# Patient Record
Sex: FEMALE | Race: WHITE | NOT HISPANIC OR LATINO | Employment: STUDENT | URBAN - METROPOLITAN AREA
[De-identification: names, ages, dates, MRNs, and addresses within clinical notes are randomized per-mention and may not be internally consistent; named-entity substitution may affect disease eponyms.]

---
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
## 2020-08-14 LAB — EXTERNAL CHLAMYDIA RESULT: NOT DETECTED

## 2021-07-01 ENCOUNTER — VBI (OUTPATIENT)
Dept: ADMINISTRATIVE | Facility: OTHER | Age: 21
End: 2021-07-01

## 2021-07-01 RX ORDER — NORETHINDRONE ACETATE AND ETHINYL ESTRADIOL 1.5-30(21)
1 KIT ORAL DAILY
COMMUNITY
End: 2021-10-18 | Stop reason: SDUPTHER

## 2021-07-01 NOTE — TELEPHONE ENCOUNTER
Upon review of the In Basket request we were able to locate, review, and update the patient chart as requested for Chlamydia  Any additional questions or concerns should be emailed to the Practice Liaisons via [email or web address] email, please do not reply via In Basket      Thank you  Quirino Goldman

## 2021-07-02 ENCOUNTER — OFFICE VISIT (OUTPATIENT)
Dept: OBGYN CLINIC | Facility: CLINIC | Age: 21
End: 2021-07-02
Payer: COMMERCIAL

## 2021-07-02 VITALS
BODY MASS INDEX: 22.82 KG/M2 | HEIGHT: 66 IN | DIASTOLIC BLOOD PRESSURE: 62 MMHG | WEIGHT: 142 LBS | SYSTOLIC BLOOD PRESSURE: 90 MMHG

## 2021-07-02 DIAGNOSIS — Z30.09 BIRTH CONTROL COUNSELING: Primary | ICD-10-CM

## 2021-07-02 PROCEDURE — 99213 OFFICE O/P EST LOW 20 MIN: CPT | Performed by: OBSTETRICS & GYNECOLOGY

## 2021-07-02 NOTE — PROGRESS NOTES
[address] [facility]   [address] [facility] [facility] [address], [place] [address]    Assessment/Plan:  1  Birth control counseling  Comments:  Pt feels some side effects with current OCP  A/P:   Reviewed in detail with pt how OCPs work and why sometimes side effects can occur during placebo week or start of new active pills  I believe her moods may be very sensitive to restarting hormones with new pack, but within a day or two she seems fine  Discussed other BCMs - Nexplanon, Depo Provera, IUDs  Also discussed considering extended use of current OCP and skipping some periods to decrease frequency of side effects  Reviewed SE with extended use can be BTB and how to deal with it  She is interested in trying extended use, she will do that now  Considering Nexplanon, IUD - will do some reasearch  Gave information as well as recom pt check out [employer]eds[employer]  org web site  She declines need of refill for pills today  Next appt: WA due in August    Subjective:   Cal Vo is a 24 y o  No obstetric history on file  female  CC: discuss birth control options    HPI:   Amirah Hi has been on this birth control pill for a couple years  She had been feeling well but noted increasing anxiety over the last few months  She feels severe anxiety and mood changes on first 1-2 days of active pills, after finishing placebo pills in pack  It gets better but then occurs again the next month  She wishes to discuss other options  Gyn History  Patient's last menstrual period was 06/21/2021 (exact date)  Last pap smear: not done     She  reports being sexually active and has had partner(s) who are Male  She reports using the following method of birth control/protection: OCP         OB History  G0    Past Medical History:  04/2021: Appendicitis     Past Surgical History:  04/13/2021: APPENDECTOMY  2016: WISDOM TOOTH EXTRACTION     Social History     Tobacco Use    Smoking status: Never Smoker    Smokeless tobacco: Never Used   Vaping Use    Vaping Use: Never used   Substance Use Topics    Alcohol use: Yes     Comment: social    Drug use: Not Currently     Types: Marijuana          Current Outpatient Medications:     norethindrone-ethinyl estradiol-iron (MICROGESTIN FE1 5/30) 1 5-30 MG-MCG tablet, Take 1 tablet by mouth daily, Disp: , Rfl:     She is allergic to morphine       ROS: Review of Systems   Constitutional: Negative  Gastrointestinal: Negative  Genitourinary: Negative  Psychiatric/Behavioral: The patient is nervous/anxious  Objective:  BP 90/62   Ht 5' 6" (1 676 m)   Wt 64 4 kg (142 lb)   LMP 06/21/2021 (Exact Date)   Breastfeeding No   BMI 22 92 kg/m²      Physical Exam  Constitutional:       Appearance: Normal appearance  Neurological:      Mental Status: She is alert and oriented to person, place, and time     Psychiatric:         Behavior: Behavior normal

## 2021-10-12 ENCOUNTER — TELEPHONE (OUTPATIENT)
Dept: OBGYN CLINIC | Facility: CLINIC | Age: 21
End: 2021-10-12

## 2021-10-12 DIAGNOSIS — Z30.41 SURVEILLANCE FOR BIRTH CONTROL, ORAL CONTRACEPTIVES: Primary | ICD-10-CM

## 2021-10-16 ENCOUNTER — TELEPHONE (OUTPATIENT)
Dept: OTHER | Facility: OTHER | Age: 21
End: 2021-10-16

## 2021-10-18 ENCOUNTER — TELEPHONE (OUTPATIENT)
Dept: OTHER | Facility: OTHER | Age: 21
End: 2021-10-18

## 2021-10-18 ENCOUNTER — TELEPHONE (OUTPATIENT)
Dept: OBGYN CLINIC | Facility: CLINIC | Age: 21
End: 2021-10-18

## 2021-10-18 DIAGNOSIS — Z30.41 SURVEILLANCE FOR BIRTH CONTROL, ORAL CONTRACEPTIVES: Primary | ICD-10-CM

## 2021-10-18 RX ORDER — NORETHINDRONE ACETATE AND ETHINYL ESTRADIOL 1.5-30(21)
1 KIT ORAL DAILY
Qty: 28 TABLET | Refills: 0 | Status: SHIPPED | OUTPATIENT
Start: 2021-10-18 | End: 2021-11-07

## 2021-11-24 ENCOUNTER — ANNUAL EXAM (OUTPATIENT)
Dept: OBGYN CLINIC | Facility: CLINIC | Age: 21
End: 2021-11-24
Payer: COMMERCIAL

## 2021-11-24 VITALS
HEIGHT: 66 IN | DIASTOLIC BLOOD PRESSURE: 68 MMHG | WEIGHT: 136.6 LBS | BODY MASS INDEX: 21.95 KG/M2 | SYSTOLIC BLOOD PRESSURE: 108 MMHG

## 2021-11-24 DIAGNOSIS — Z01.419 ENCOUNTER FOR ANNUAL ROUTINE GYNECOLOGICAL EXAMINATION: Primary | ICD-10-CM

## 2021-11-24 DIAGNOSIS — Z11.3 ROUTINE SCREENING FOR STI (SEXUALLY TRANSMITTED INFECTION): ICD-10-CM

## 2021-11-24 DIAGNOSIS — Z12.4 CERVICAL CANCER SCREENING: ICD-10-CM

## 2021-11-24 DIAGNOSIS — Z30.41 SURVEILLANCE FOR BIRTH CONTROL, ORAL CONTRACEPTIVES: ICD-10-CM

## 2021-11-24 PROCEDURE — S0612 ANNUAL GYNECOLOGICAL EXAMINA: HCPCS | Performed by: OBSTETRICS & GYNECOLOGY

## 2021-11-24 RX ORDER — ESCITALOPRAM OXALATE 10 MG/1
10 TABLET ORAL DAILY
COMMUNITY
Start: 2021-11-22

## 2021-11-24 RX ORDER — IBUPROFEN 200 MG
200 TABLET ORAL EVERY 6 HOURS PRN
COMMUNITY

## 2021-11-24 RX ORDER — NORETHINDRONE ACETATE AND ETHINYL ESTRADIOL 1.5-30(21)
1 KIT ORAL DAILY
Qty: 84 TABLET | Refills: 4 | Status: SHIPPED | OUTPATIENT
Start: 2021-11-24

## 2021-11-30 LAB
C TRACH RRNA CVX QL NAA+PROBE: NEGATIVE
CYTOLOGIST CVX/VAG CYTO: NORMAL
DX ICD CODE: NORMAL
N GONORRHOEA RRNA CVX QL NAA+PROBE: NEGATIVE
OTHER STN SPEC: NORMAL
OTHER STN SPEC: NORMAL
PATH REPORT.FINAL DX SPEC: NORMAL
SL AMB NOTE:: NORMAL
SL AMB SPECIMEN ADEQUACY: NORMAL
SL AMB TEST METHODOLOGY: NORMAL

## 2021-12-23 ENCOUNTER — OFFICE VISIT (OUTPATIENT)
Dept: GASTROENTEROLOGY | Facility: CLINIC | Age: 21
End: 2021-12-23
Payer: COMMERCIAL

## 2021-12-23 VITALS
BODY MASS INDEX: 23.46 KG/M2 | HEIGHT: 66 IN | SYSTOLIC BLOOD PRESSURE: 108 MMHG | HEART RATE: 74 BPM | WEIGHT: 146 LBS | DIASTOLIC BLOOD PRESSURE: 68 MMHG

## 2021-12-23 DIAGNOSIS — K58.0 IRRITABLE BOWEL SYNDROME WITH DIARRHEA: ICD-10-CM

## 2021-12-23 DIAGNOSIS — R10.9 ABDOMINAL CRAMPING: Primary | ICD-10-CM

## 2021-12-23 PROCEDURE — 99203 OFFICE O/P NEW LOW 30 MIN: CPT | Performed by: REGISTERED NURSE

## 2022-03-05 LAB — HBA1C MFR BLD HPLC: 5.2 %

## 2022-12-03 DIAGNOSIS — Z30.41 SURVEILLANCE FOR BIRTH CONTROL, ORAL CONTRACEPTIVES: ICD-10-CM

## 2022-12-06 ENCOUNTER — TELEPHONE (OUTPATIENT)
Dept: OBGYN CLINIC | Facility: CLINIC | Age: 22
End: 2022-12-06

## 2022-12-06 RX ORDER — NORETHINDRONE ACETATE AND ETHINYL ESTRADIOL AND FERROUS FUMARATE 1.5-30(21)
KIT ORAL
Qty: 84 TABLET | Refills: 0 | Status: SHIPPED | OUTPATIENT
Start: 2022-12-06

## 2022-12-06 NOTE — TELEPHONE ENCOUNTER
Pt called and has a WA scheduled for 2/3/22  Pt is requesting a 90 day supply  Pt is Sunday start, recommended to take 2 pills today and 2 pills tomorrow, use back up protection and may have irreg spotting with current cycles  Please review and advise

## 2022-12-06 NOTE — TELEPHONE ENCOUNTER
Pt left a message requesting to have a refill for her birth control to be sent to [facility], pt was due to start her pills 3 days ago  Last WA 11/24/21-Left a message for patient informing she is past due for Stefano Pierre, please call back to schedule and will forward to provider to review for courtesy refill until seen

## 2022-12-06 NOTE — TELEPHONE ENCOUNTER
Agree   I sent script  33 yo F, hx adhd, depression, presents with LUQ abdominal pain, nonradiating, associated with mild nausea, and decreased appetite. took 2 tablets of pepcid pta, with minimal relief. pain constant for one day. denies associated fever, chills, vomiting or diarrhea. denies hx of abdominal surgeries or similar. denies rash. denies cp, sob or cough. notes no changes in diet. denies etoh.

## 2023-02-14 ENCOUNTER — ANNUAL EXAM (OUTPATIENT)
Dept: OBGYN CLINIC | Facility: CLINIC | Age: 23
End: 2023-02-14

## 2023-02-14 VITALS
DIASTOLIC BLOOD PRESSURE: 68 MMHG | BODY MASS INDEX: 27.83 KG/M2 | HEIGHT: 66 IN | SYSTOLIC BLOOD PRESSURE: 110 MMHG | WEIGHT: 173.2 LBS

## 2023-02-14 DIAGNOSIS — Z30.41 SURVEILLANCE FOR BIRTH CONTROL, ORAL CONTRACEPTIVES: ICD-10-CM

## 2023-02-14 DIAGNOSIS — Z30.09 COUNSELING FOR BIRTH CONTROL REGARDING INTRAUTERINE DEVICE (IUD): ICD-10-CM

## 2023-02-14 DIAGNOSIS — Z01.419 ENCOUNTER FOR ANNUAL ROUTINE GYNECOLOGICAL EXAMINATION: Primary | ICD-10-CM

## 2023-02-14 RX ORDER — ESCITALOPRAM OXALATE 5 MG/1
TABLET ORAL
COMMUNITY
Start: 2022-12-28

## 2023-02-14 RX ORDER — NORETHINDRONE ACETATE AND ETHINYL ESTRADIOL 1.5-30(21)
1 KIT ORAL DAILY
Qty: 84 TABLET | Refills: 4 | Status: SHIPPED | OUTPATIENT
Start: 2023-02-14

## 2023-02-14 NOTE — PROGRESS NOTES
Annual Wellness Visit  [address], [address], [facility], [address]    ASSESSMENT/PLAN: Bronwyn Henderson is a 25 y o  Jenny Wallace who presents for annual gynecologic exam     Encounter for routine gynecologic examination  - Routine well woman exam completed today  - Cervical Cancer Screening: Current ASCCP Guidelines reviewed  Last Pap: 11/24/2021 normal  Next Pap Due: 1 year, routine   - HPV Vaccination status: Immunization series complete  - STI screening offered including HIV testing: offered, pt declined  - Contraceptive counseling discussed  Current contraception: oral contraceptives (estrogen/progesterone),   - The following were reviewed in today's visit: STD testing, use and side effects of OCPs, family planning choices, exercise and healthy diet    Additional problems addressed during this visit:  1  Encounter for annual routine gynecological examination    2  Surveillance for birth control, oral contraceptives  A/P:   No contraindication to estrogen/progesterone birth control use identified  Reviewed that only condoms protect against STIs  Refilled for 1 year  Patient may switch to IUD  -     norethindrone-ethinyl estradiol-iron (Cathy FE 1 5/30) 1 5-30 MG-MCG tablet; Take 1 tablet by mouth daily    3  Counseling for birth control regarding intrauterine device (IUD)  A/P:   Pt interested in Progesterone IUD  Reviewed differences between Progesterone and non-hormonal IUDs  Discussed R/B/A, and placement procedure  Patient should call her insurance to check coverage  Pt to continue to use oral contraceptives (estrogen/progesterone)  Call office with first day of menses to schedule  Msg to  to check insurance coverage  Next visit: 1 year Wellness      CC:  Annual Gynecologic Examination    HPI: Bronwyn Henderson is a 25 y o  Jenny Wallace who presents for annual gynecologic examination    She denies any breast, urinary or pelvic issues at today's visit  Taking OCPs and getting monthly periods, very light  No new partner  Interested in IUD  Gyn History  Patient's last menstrual period was 2023 (exact date)  Last Pap: 2021 was normal    She  reports being sexually active and has had partner(s) who are male  She reports using the following method of birth control/protection: OCP  OB History      Past Medical History:  No date: Anxiety     Past Surgical History:  2021: APPENDECTOMY  2016: WISDOM TOOTH EXTRACTION     Family History   Problem Relation Age of Onset   • Anxiety disorder Mother    • Hyperlipidemia Father    • Polycystic ovary syndrome Sister    • No Known Problems Brother    • Heart disease Maternal Grandmother    • Obesity Maternal Grandmother    • Lung cancer Maternal Grandfather    • Alzheimer's disease Maternal Grandfather    • Leukemia Paternal Grandfather    • Breast cancer Paternal Aunt    • Uterine cancer Neg Hx    • Ovarian cancer Neg Hx    • Colon cancer Neg Hx    • Colon polyps Neg Hx         Social History     Tobacco Use   • Smoking status: Never   • Smokeless tobacco: Never   Vaping Use   • Vaping Use: Former   Substance Use Topics   • Alcohol use: Yes     Comment: social   • Drug use: Not Currently     Types: Marijuana          Current Outpatient Medications:   •  escitalopram (LEXAPRO) 10 mg tablet, Take 10 mg by mouth daily  , Disp: , Rfl:   •  escitalopram (LEXAPRO) 5 mg tablet, TAKE 1 TABLET BY MOUTH DAILY   TAKE WITH 10 MG = 15 MG DAILY, Disp: , Rfl:   •  hyoscyamine (LEVSIN/SL) 0 125 mg SL tablet, Take 1 tablet (0 125 mg total) by mouth every 4 (four) hours as needed for cramping, Disp: 60 tablet, Rfl: 2  •  ibuprofen (MOTRIN) 200 mg tablet, Take 200 mg by mouth every 6 (six) hours as needed for mild pain, Disp: , Rfl:   •  norethindrone-ethinyl estradiol-iron (Cathy FE 1 ) 1 5-30 MG-MCG tablet, Take 1 tablet by mouth daily, Disp: 84 tablet, Rfl: 4    She is allergic to morphine       ROS negative except as noted in HPI    Objective:  /68 (BP Location: Left arm, Patient Position: Sitting, Cuff Size: Large)   Ht 5' 6 25" (1 683 m)   Wt 78 6 kg (173 lb 3 2 oz)   LMP 01/24/2023 (Exact Date)   Breastfeeding No   BMI 27 74 kg/m²      Physical Exam

## 2023-02-14 NOTE — PATIENT INSTRUCTIONS
- Maintain healthy weight with BMI ideally between 18-25     - Eat a healthy diet, including multiple servings of vegetables and fruits, as well as lean protein sources  - Get at least 150 minutes of moderate aerobic activity or 75 minutes of vigorous aerobic activity a week, or a combination of moderate and vigorous activity  Greater amounts of exercise will provide an even greater health benefit  - Ensure diet provides 1200mg of Calcium daily (divided) and 800IU of vitamin D, or take supplements to meet this  - Safe sex practices recommended  - Resources - information on birth control and sexually transmitted infections - www bedsider  org            - information on sexually transmitted infections - www cdc gov/std/     Interested in IUD?  - check your insurance for IUD coverage, to see if you have any out of pocket cost  - continue using contraception until IUD is placed,   - call office to schedule appointment - call with first day of period for us to schedule within that week, no sex until placed  - take Motrin 600mg 1 hour prior to appointment

## 2023-02-14 NOTE — LETTER
February 14, 2023     Osmel Allen, [address]  [address]    Patient: Elysia Fox   YOB: 2000   Date of Visit: 2/14/2023       Dear Dr Kendall Somers: Thank you for referring Chaya Jacobs to me for evaluation  Below are my notes for this consultation  If you have questions, please do not hesitate to call me  I look forward to following your patient along with you  Sincerely,        Mae Rowan MD        CC: No Recipients  Mae Rowan MD  2/14/2023  4:38 PM  Sign when Signing Visit  Annual Wellness Visit  [address], [address], [facility], [facility] [address] [facility] [address]    ASSESSMENT/PLAN: Elysia Fox is a 25 y o  Arian Bi who presents for annual gynecologic exam     Encounter for routine gynecologic examination  - Routine well woman exam completed today  - Cervical Cancer Screening: Current ASCCP Guidelines reviewed  Last Pap: 11/24/2021 normal  Next Pap Due: 1 year, routine   - HPV Vaccination status: Immunization series complete  - STI screening offered including HIV testing: offered, pt declined  - Contraceptive counseling discussed  Current contraception: oral contraceptives (estrogen/progesterone),   - The following were reviewed in today's visit: STD testing, use and side effects of OCPs, family planning choices, exercise and healthy diet    Additional problems addressed during this visit:  1  Encounter for annual routine gynecological examination    2  Surveillance for birth control, oral contraceptives  A/P:   No contraindication to estrogen/progesterone birth control use identified  Reviewed that only condoms protect against STIs  Refilled for 1 year  Patient may switch to IUD  -     norethindrone-ethinyl estradiol-iron (Cathy ARREDONDO 1 5/30) 1 5-30 MG-MCG tablet; Take 1 tablet by mouth daily    3   Counseling for birth control regarding intrauterine device (IUD)  A/P:   Pt interested in Progesterone IUD  Reviewed differences between Progesterone and non-hormonal IUDs  Discussed R/B/A, and placement procedure  Patient should call her insurance to check coverage  Pt to continue to use oral contraceptives (estrogen/progesterone)  Call office with first day of menses to schedule  Msg to  to check insurance coverage  Next visit: 1 year Wellness      CC:  Annual Gynecologic Examination    HPI: Braeden Kramer is a 25 y o  [facility] who presents for annual gynecologic examination  She denies any breast, urinary or pelvic issues at today's visit  Taking OCPs and getting monthly periods, very light  No new partner  Interested in IUD  Gyn History  Patient's last menstrual period was 2023 (exact date)  Last Pap: 2021 was normal    She  reports being sexually active and has had partner(s) who are male  She reports using the following method of birth control/protection: OCP  OB History      Past Medical History:  No date:  Anxiety     Past Surgical History:  2021: APPENDECTOMY  2016: WISDOM TOOTH EXTRACTION     Family History   Problem Relation Age of Onset   • Anxiety disorder Mother    • Hyperlipidemia Father    • Polycystic ovary syndrome Sister    • No Known Problems Brother    • Heart disease Maternal Grandmother    • Obesity Maternal Grandmother    • Lung cancer Maternal Grandfather    • Alzheimer's disease Maternal Grandfather    • Leukemia Paternal Grandfather    • Breast cancer Paternal Aunt    • Uterine cancer Neg Hx    • Ovarian cancer Neg Hx    • Colon cancer Neg Hx    • Colon polyps Neg Hx         Social History     Tobacco Use   • Smoking status: Never   • Smokeless tobacco: Never   Vaping Use   • Vaping Use: Former   Substance Use Topics   • Alcohol use: Yes     Comment: social   • Drug use: Not Currently     Types: Marijuana          Current Outpatient Medications:   •  escitalopram (LEXAPRO) 10 mg tablet, Take 10 mg by mouth daily  , Disp: , Rfl:   •  escitalopram (LEXAPRO) 5 mg tablet, TAKE 1 TABLET BY MOUTH DAILY  TAKE WITH 10 MG = 15 MG DAILY, Disp: , Rfl:   •  hyoscyamine (LEVSIN/SL) 0 125 mg SL tablet, Take 1 tablet (0 125 mg total) by mouth every 4 (four) hours as needed for cramping, Disp: 60 tablet, Rfl: 2  •  ibuprofen (MOTRIN) 200 mg tablet, Take 200 mg by mouth every 6 (six) hours as needed for mild pain, Disp: , Rfl:   •  norethindrone-ethinyl estradiol-iron ([facility] 1 5/30) 1 5-30 MG-MCG tablet, Take 1 tablet by mouth daily, Disp: 84 tablet, Rfl: 4    She is allergic to morphine       ROS negative except as noted in HPI    Objective:  /68 (BP Location: Left arm, Patient Position: Sitting, Cuff Size: Large)   Ht 5' 6 25" (1 683 m)   Wt 78 6 kg (173 lb 3 2 oz)   LMP 01/24/2023 (Exact Date)   Breastfeeding No   BMI 27 74 kg/m²     Physical Exam

## 2024-03-13 ENCOUNTER — TELEPHONE (OUTPATIENT)
Age: 24
End: 2024-03-13

## 2024-03-13 ENCOUNTER — OFFICE VISIT (OUTPATIENT)
Age: 24
End: 2024-03-13
Payer: COMMERCIAL

## 2024-03-13 VITALS
DIASTOLIC BLOOD PRESSURE: 58 MMHG | HEIGHT: 66 IN | WEIGHT: 174 LBS | SYSTOLIC BLOOD PRESSURE: 102 MMHG | BODY MASS INDEX: 27.97 KG/M2

## 2024-03-13 DIAGNOSIS — R14.2 BELCHING SYMPTOM: ICD-10-CM

## 2024-03-13 DIAGNOSIS — R10.9 ABDOMINAL CRAMPING: ICD-10-CM

## 2024-03-13 DIAGNOSIS — K21.9 GASTROESOPHAGEAL REFLUX DISEASE, UNSPECIFIED WHETHER ESOPHAGITIS PRESENT: ICD-10-CM

## 2024-03-13 DIAGNOSIS — R07.9 CHEST PAIN DUE TO GERD: ICD-10-CM

## 2024-03-13 DIAGNOSIS — Z12.11 SCREENING FOR COLON CANCER: ICD-10-CM

## 2024-03-13 DIAGNOSIS — K58.9 IRRITABLE BOWEL SYNDROME, UNSPECIFIED TYPE: Primary | ICD-10-CM

## 2024-03-13 DIAGNOSIS — K21.9 CHEST PAIN DUE TO GERD: ICD-10-CM

## 2024-03-13 PROCEDURE — 99244 OFF/OP CNSLTJ NEW/EST MOD 40: CPT | Performed by: INTERNAL MEDICINE

## 2024-03-13 RX ORDER — HYDROXYZINE HYDROCHLORIDE 25 MG/1
TABLET, FILM COATED ORAL
COMMUNITY
Start: 2024-01-05

## 2024-03-13 RX ORDER — DESVENLAFAXINE SUCCINATE 50 MG/1
50 TABLET, EXTENDED RELEASE ORAL DAILY
COMMUNITY
Start: 2024-01-01

## 2024-03-13 RX ORDER — FAMOTIDINE 40 MG/1
40 TABLET, FILM COATED ORAL DAILY
Qty: 30 TABLET | Refills: 5 | Status: SHIPPED | OUTPATIENT
Start: 2024-03-13

## 2024-03-13 NOTE — TELEPHONE ENCOUNTER
Scheduled date of EGD(as of today): 4/19/2024  Physician performing EGD: ELIN  Location of EGD: [facility]  Instructions reviewed with patient by: DEANA  Clearances: N

## 2024-03-13 NOTE — PATIENT INSTRUCTIONS
Continue reflux diet and precautions.  Continue avoidance of foods that trigger IBS symptoms including fatty foods and spicy foods.  Restart hyoscyamine as needed.  Start famotidine 40 mg p.o. daily.

## 2024-03-13 NOTE — LETTER
March 13, 2024     ANNA Maguire  [address]  [address]  [place] [address]    Patient: Linda Esposito   YOB: 2000   Date of Visit: 3/13/2024       Dear Dr. Bentley:    Thank you for referring Linda Esposito to me for evaluation. Below are my notes for this consultation.    If you have questions, please do not hesitate to call me. I look forward to following your patient along with you.         Sincerely,        Mena Norris MD        CC: No Recipients    Mena Norris MD  3/13/2024  5:11 PM  Sign when Signing Visit    Counts include 234 beds at the [facility] - Outpatient Consultation  Linda Esposito 23 y.o. female MRN: [id number]  Encounter: [id number]    ASSESSMENT AND PLAN:      1. Gastroesophageal reflux disease, unspecified whether esophagitis present  - famotidine (PEPCID) 40 MG tablet; Take 1 tablet (40 mg total) by mouth daily  Dispense: 30 tablet; Refill: 5  - EGD; Future  2. Chest pain due to GERD  3. Belching symptom  Heartburn and atypical chest pain are likely due to GERD.  Differential diagnosis includes peptic esophagitis, eosinophilic esophagitis, peptic ulcer disease, gastritis, H. pylori.  No alarm symptoms of dysphagia weight loss or bleeding.  Given months of progressively worsening symptoms despite OTC acid reducers we will plan endoscopy to further assess.  Reflux diet and precautions  Start famotidine 40 mg daily  Schedule EGD at [facility]    4. Irritable bowel syndrome, unspecified type  5. Abdominal cramping  Chronic IBS symptoms since childhood have not changed significantly.  Has diarrhea often triggered by food or travel or anxiety.  Has had relief with hyoscyamine in the past.  Continue with dietary management avoiding any trigger foods  Will restart high-dose thiamine 0.125 mg as needed  Observe for any change in symptoms  - hyoscyamine (LEVSIN/SL) 0.125 mg SL tablet; Take 1 tablet (0.125 mg total) by mouth every 6 (six)  hours as needed for cramping  Dispense: 30 tablet; Refill: 3    6. Screening for colon cancer  Average risk, no family history.  Recommend screening colonoscopy at age 45 unless needed in the future for further evaluation of her diarrhea.      Followup Appointment: 3 to 4 months pending result of EGD  ______________________________________________________________________    Chief Complaint   Patient presents with   • Hx of IBS     Discuss refill of Levsin etc (on approx 10 yrs ago)   • GERD   • belching     Frequent and can be painful   • Nausea       HPI:   Linda Esposito is a 23 y.o. year old female who presents with atypical chest pain and heartburn along with her usual IBS symptoms.  IBS since childhood.  Also anxiety that exacerbates symptoms.  Anxiety under better control and was doing well.  Over the past months feeling like symptoms not as well controlled.  Having more belching.  Occasional regurgitation.  Gets chest pain and heartburn.  Trying to eat slower and more bland food.  No dysphagia.  No early satiety, but starts belching 30 minutes after eating.  Limiting carbonation.  Drinks caffeine.  Gets anxiety with travel, gets diarrhea and crampy abdominal pain.  Worse with greasy or spicy foods.  Takes imodium or peptobismol as needed.  Similar symptoms since childhood.  No melena or heme.  Rare EtOH.      Historical Information  Past Medical History:   Diagnosis Date   • Anxiety    • GERD (gastroesophageal reflux disease) 1 Jan 2024   • Irritable bowel syndrome 2013     Past Surgical History:   Procedure Laterality Date   • APPENDECTOMY  04/13/2021   • WISDOM TOOTH EXTRACTION  2016     Social History     Substance and Sexual Activity   Alcohol Use Not Currently    Comment: Currently not drinking due to my medication and GI issues     Social History     Substance and Sexual Activity   Drug Use Yes   • Frequency: 1.0 times per week   • Types: Marijuana     Social History     Tobacco Use   Smoking  "Status Never   Smokeless Tobacco Never   Tobacco Comments    Used a vape in college for a few months, have not in years     Family History   Problem Relation Age of Onset   • Anxiety disorder Mother    • Mental illness Mother         DANIEL   • Hyperlipidemia Father    • Polycystic ovary syndrome Sister    • No Known Problems Brother    • Heart disease Maternal Grandmother    • Obesity Maternal Grandmother    • Lung cancer Maternal Grandfather    • Alzheimer's disease Maternal Grandfather    • Leukemia Paternal Grandfather    • Breast cancer Paternal Aunt    • Uterine cancer Neg Hx    • Ovarian cancer Neg Hx    • Colon cancer Neg Hx    • Colon polyps Neg Hx        Meds/Allergies    Current Outpatient Medications:   •  desvenlafaxine succinate (PRISTIQ) 50 mg 24 hr tablet  •  famotidine (PEPCID) 40 MG tablet  •  hydrOXYzine HCL (ATARAX) 25 mg tablet  •  hyoscyamine (LEVSIN/SL) 0.125 mg SL tablet  •  norethindrone-ethinyl estradiol-iron (Cathy FE 1.5/30) 1.5-30 MG-MCG tablet  •  escitalopram (LEXAPRO) 10 mg tablet  •  escitalopram (LEXAPRO) 5 mg tablet    Allergies   Allergen Reactions   • Morphine Rash       PHYSICAL EXAM:    Blood pressure 102/58, height 5' 6\" (1.676 m), weight 78.9 kg (174 lb), not currently breastfeeding. Body mass index is 28.08 kg/m².  General Appearance: NAD, cooperative, alert  Eyes: Anicteric, conjunctiva pink   ENT:  Normocephalic, atraumatic, normal mucosa.    Neck:  Supple, symmetrical, trachea midline,   Resp:  Clear to auscultation bilaterally; no rales, rhonchi or wheezing; respirations unlabored   CV:  S1 S2, Regular rate and rhythm; no murmur, rub, or gallop.  GI:  Soft, non-tender, non-distended; normal bowel sounds; no masses, no organomegaly   Rectal: Deferred  Musculoskeletal: No cyanosis, clubbing or edema. Normal ROM.  Skin:  No jaundice, rashes, or lesions   Heme/Lymph: No palpable cervical lymphadenopathy  Psych: Normal affect, good eye contact  Neuro: No gross deficits, " "AAOx3    Lab Results:   No results found for: \"WBC\", \"HGB\", \"HCT\", \"MCV\", \"PLT\"  No results found for: \"NA\", \"K\", \"CL\", \"CO2\", \"ANIONGAP\", \"BUN\", \"CREATININE\", \"GLUCOSE\", \"GLUF\", \"CALCIUM\", \"CORRECTEDCA\", \"AST\", \"ALT\", \"ALKPHOS\", \"PROT\", \"BILITOT\", \"EGFR\"      Radiology Results:   No results found.      REVIEW OF SYSTEMS:    CONSTITUTIONAL: Denies any fever, chills, rigors, and weight loss.  HEENT: No earache or tinnitus. Denies hearing loss or visual disturbances.  CARDIOVASCULAR: No chest pain or palpitations.   RESPIRATORY: Denies any cough, hemoptysis, shortness of breath or dyspnea on exertion.  GASTROINTESTINAL: As noted in the History of Present Illness.   GENITOURINARY: No problems with urination. Denies any hematuria or dysuria.  NEUROLOGIC: No dizziness or vertigo, denies headaches.   MUSCULOSKELETAL: Denies any muscle or joint pain.   SKIN: Denies skin rashes or itching.   ENDOCRINE: Denies excessive thirst. Denies intolerance to heat or cold.  PSYCHOSOCIAL: Denies depression or anxiety. Denies any recent memory loss.     Answers submitted by the patient for this visit:  Abdominal Pain Questionnaire (Submitted on 3/13/2024)  Chief Complaint: Abdominal pain  Chronicity: chronic  Onset: more than 1 year ago  Onset quality: gradual  Frequency: every several days  Episode duration: 1 Hours  Progression since onset: waxing and waning  Pain location: epigastric region, periumbilical region  Pain - numeric: 3/10  Pain quality: cramping  Radiates to: suprapubic region  anorexia: No  arthralgias: No  belching: Yes  constipation: No  diarrhea: Yes  dysuria: No  fever: No  flatus: No  frequency: No  headaches: No  hematochezia: No  hematuria: No  melena: No  myalgias: No  nausea: Yes  weight loss: No  vomiting: No  Aggravated by: certain positions, drinking alcohol  Relieved by: palpation, recumbency    "

## 2024-03-13 NOTE — PROGRESS NOTES
[facility] - Outpatient Consultation  Linda Esposito 23 y.o. female MRN: [id number]  Encounter: [id number]    ASSESSMENT AND PLAN:      1. Gastroesophageal reflux disease, unspecified whether esophagitis present  - famotidine (PEPCID) 40 MG tablet; Take 1 tablet (40 mg total) by mouth daily  Dispense: 30 tablet; Refill: 5  - EGD; Future  2. Chest pain due to GERD  3. Belching symptom  Heartburn and atypical chest pain are likely due to GERD.  Differential diagnosis includes peptic esophagitis, eosinophilic esophagitis, peptic ulcer disease, gastritis, H. pylori.  No alarm symptoms of dysphagia weight loss or bleeding.  Given months of progressively worsening symptoms despite OTC acid reducers we will plan endoscopy to further assess.  Reflux diet and precautions  Start famotidine 40 mg daily  Schedule EGD at [facility]    4. Irritable bowel syndrome, unspecified type  5. Abdominal cramping  Chronic IBS symptoms since childhood have not changed significantly.  Has diarrhea often triggered by food or travel or anxiety.  Has had relief with hyoscyamine in the past.  Continue with dietary management avoiding any trigger foods  Will restart high-dose thiamine 0.125 mg as needed  Observe for any change in symptoms  - hyoscyamine (LEVSIN/SL) 0.125 mg SL tablet; Take 1 tablet (0.125 mg total) by mouth every 6 (six) hours as needed for cramping  Dispense: 30 tablet; Refill: 3    6. Screening for colon cancer  Average risk, no family history.  Recommend screening colonoscopy at age 45 unless needed in the future for further evaluation of her diarrhea.      Followup Appointment: 3 to 4 months pending result of EGD  ______________________________________________________________________    Chief Complaint   Patient presents with    Hx of IBS     Discuss refill of Levsin etc (on approx 10 yrs ago)    GERD    belching     Frequent and can be painful    Nausea       HPI:   Lindashahriar Orlandoce  Cate is a 23 y.o. year old female who presents with atypical chest pain and heartburn along with her usual IBS symptoms.  IBS since childhood.  Also anxiety that exacerbates symptoms.  Anxiety under better control and was doing well.  Over the past months feeling like symptoms not as well controlled.  Having more belching.  Occasional regurgitation.  Gets chest pain and heartburn.  Trying to eat slower and more bland food.  No dysphagia.  No early satiety, but starts belching 30 minutes after eating.  Limiting carbonation.  Drinks caffeine.  Gets anxiety with travel, gets diarrhea and crampy abdominal pain.  Worse with greasy or spicy foods.  Takes imodium or peptobismol as needed.  Similar symptoms since childhood.  No melena or heme.  Rare EtOH.      Historical Information   Past Medical History:   Diagnosis Date    Anxiety     GERD (gastroesophageal reflux disease) 1 Jan 2024    Irritable bowel syndrome 2013     Past Surgical History:   Procedure Laterality Date    APPENDECTOMY  04/13/2021    WISDOM TOOTH EXTRACTION  2016     Social History     Substance and Sexual Activity   Alcohol Use Not Currently    Comment: Currently not drinking due to my medication and GI issues     Social History     Substance and Sexual Activity   Drug Use Yes    Frequency: 1.0 times per week    Types: Marijuana     Social History     Tobacco Use   Smoking Status Never   Smokeless Tobacco Never   Tobacco Comments    Used a vape in college for a few months, have not in years     Family History   Problem Relation Age of Onset    Anxiety disorder Mother     Mental illness Mother         DANIEL    Hyperlipidemia Father     Polycystic ovary syndrome Sister     No Known Problems Brother     Heart disease Maternal Grandmother     Obesity Maternal Grandmother     Lung cancer Maternal Grandfather     Alzheimer's disease Maternal Grandfather     Leukemia Paternal Grandfather     Breast cancer Paternal Aunt     Uterine cancer Neg Hx     Ovarian  "cancer Neg Hx     Colon cancer Neg Hx     Colon polyps Neg Hx        Meds/Allergies     Current Outpatient Medications:     desvenlafaxine succinate (PRISTIQ) 50 mg 24 hr tablet    famotidine (PEPCID) 40 MG tablet    hydrOXYzine HCL (ATARAX) 25 mg tablet    hyoscyamine (LEVSIN/SL) 0.125 mg SL tablet    norethindrone-ethinyl estradiol-iron (Cathy FE 1.5/30) 1.5-30 MG-MCG tablet    escitalopram (LEXAPRO) 10 mg tablet    escitalopram (LEXAPRO) 5 mg tablet    Allergies   Allergen Reactions    Morphine Rash       PHYSICAL EXAM:    Blood pressure 102/58, height 5' 6\" (1.676 m), weight 78.9 kg (174 lb), not currently breastfeeding. Body mass index is 28.08 kg/m².  General Appearance: NAD, cooperative, alert  Eyes: Anicteric, conjunctiva pink   ENT:  Normocephalic, atraumatic, normal mucosa.    Neck:  Supple, symmetrical, trachea midline,   Resp:  Clear to auscultation bilaterally; no rales, rhonchi or wheezing; respirations unlabored   CV:  S1 S2, Regular rate and rhythm; no murmur, rub, or gallop.  GI:  Soft, non-tender, non-distended; normal bowel sounds; no masses, no organomegaly   Rectal: Deferred  Musculoskeletal: No cyanosis, clubbing or edema. Normal ROM.  Skin:  No jaundice, rashes, or lesions   Heme/Lymph: No palpable cervical lymphadenopathy  Psych: Normal affect, good eye contact  Neuro: No gross deficits, AAOx3    Lab Results:   No results found for: \"WBC\", \"HGB\", \"HCT\", \"MCV\", \"PLT\"  No results found for: \"NA\", \"K\", \"CL\", \"CO2\", \"ANIONGAP\", \"BUN\", \"CREATININE\", \"GLUCOSE\", \"GLUF\", \"CALCIUM\", \"CORRECTEDCA\", \"AST\", \"ALT\", \"ALKPHOS\", \"PROT\", \"BILITOT\", \"EGFR\"      Radiology Results:   No results found.      REVIEW OF SYSTEMS:    CONSTITUTIONAL: Denies any fever, chills, rigors, and weight loss.  HEENT: No earache or tinnitus. Denies hearing loss or visual disturbances.  CARDIOVASCULAR: No chest pain or palpitations.   RESPIRATORY: Denies any cough, hemoptysis, shortness of breath or dyspnea on " exertion.  GASTROINTESTINAL: As noted in the History of Present Illness.   GENITOURINARY: No problems with urination. Denies any hematuria or dysuria.  NEUROLOGIC: No dizziness or vertigo, denies headaches.   MUSCULOSKELETAL: Denies any muscle or joint pain.   SKIN: Denies skin rashes or itching.   ENDOCRINE: Denies excessive thirst. Denies intolerance to heat or cold.  PSYCHOSOCIAL: Denies depression or anxiety. Denies any recent memory loss.     Answers submitted by the patient for this visit:  Abdominal Pain Questionnaire (Submitted on 3/13/2024)  Chief Complaint: Abdominal pain  Chronicity: chronic  Onset: more than 1 year ago  Onset quality: gradual  Frequency: every several days  Episode duration: 1 Hours  Progression since onset: waxing and waning  Pain location: epigastric region, periumbilical region  Pain - numeric: 3/10  Pain quality: cramping  Radiates to: suprapubic region  anorexia: No  arthralgias: No  belching: Yes  constipation: No  diarrhea: Yes  dysuria: No  fever: No  flatus: No  frequency: No  headaches: No  hematochezia: No  hematuria: No  melena: No  myalgias: No  nausea: Yes  weight loss: No  vomiting: No  Aggravated by: certain positions, drinking alcohol  Relieved by: palpation, recumbency

## 2024-04-05 DIAGNOSIS — K21.9 GASTROESOPHAGEAL REFLUX DISEASE, UNSPECIFIED WHETHER ESOPHAGITIS PRESENT: ICD-10-CM

## 2024-04-05 RX ORDER — FAMOTIDINE 40 MG/1
40 TABLET, FILM COATED ORAL DAILY
Qty: 90 TABLET | Refills: 1 | Status: SHIPPED | OUTPATIENT
Start: 2024-04-05

## 2024-04-19 ENCOUNTER — HOSPITAL ENCOUNTER (OUTPATIENT)
Dept: GASTROENTEROLOGY | Facility: AMBULATORY SURGERY CENTER | Age: 24
Discharge: HOME | End: 2024-04-19
Payer: COMMERCIAL

## 2024-04-19 ENCOUNTER — ANESTHESIA EVENT (OUTPATIENT)
Dept: GASTROENTEROLOGY | Facility: AMBULATORY SURGERY CENTER | Age: 24
End: 2024-04-19

## 2024-04-19 ENCOUNTER — ANESTHESIA (OUTPATIENT)
Dept: ANESTHESIOLOGY | Facility: AMBULATORY SURGERY CENTER | Age: 24
End: 2024-04-19

## 2024-04-19 ENCOUNTER — ANESTHESIA EVENT (OUTPATIENT)
Dept: ANESTHESIOLOGY | Facility: AMBULATORY SURGERY CENTER | Age: 24
End: 2024-04-19

## 2024-04-19 ENCOUNTER — ANESTHESIA (OUTPATIENT)
Dept: GASTROENTEROLOGY | Facility: AMBULATORY SURGERY CENTER | Age: 24
End: 2024-04-19

## 2024-04-19 VITALS
TEMPERATURE: 96.5 F | WEIGHT: 174 LBS | BODY MASS INDEX: 27.97 KG/M2 | HEIGHT: 66 IN | SYSTOLIC BLOOD PRESSURE: 123 MMHG | HEART RATE: 60 BPM | OXYGEN SATURATION: 95 % | DIASTOLIC BLOOD PRESSURE: 68 MMHG | RESPIRATION RATE: 16 BRPM

## 2024-04-19 DIAGNOSIS — K21.9 GASTROESOPHAGEAL REFLUX DISEASE, UNSPECIFIED WHETHER ESOPHAGITIS PRESENT: ICD-10-CM

## 2024-04-19 LAB
EXT PREGNANCY TEST URINE: NEGATIVE
EXT. CONTROL: NORMAL

## 2024-04-19 PROCEDURE — 43239 EGD BIOPSY SINGLE/MULTIPLE: CPT | Performed by: INTERNAL MEDICINE

## 2024-04-19 PROCEDURE — 88305 TISSUE EXAM BY PATHOLOGIST: CPT | Performed by: PATHOLOGY

## 2024-04-19 RX ORDER — PROPOFOL 10 MG/ML
INJECTION, EMULSION INTRAVENOUS AS NEEDED
Status: DISCONTINUED | OUTPATIENT
Start: 2024-04-19 | End: 2024-04-19

## 2024-04-19 RX ORDER — FENTANYL CITRATE 50 UG/ML
INJECTION, SOLUTION INTRAMUSCULAR; INTRAVENOUS AS NEEDED
Status: DISCONTINUED | OUTPATIENT
Start: 2024-04-19 | End: 2024-04-19

## 2024-04-19 RX ORDER — LIDOCAINE HYDROCHLORIDE 20 MG/ML
INJECTION, SOLUTION EPIDURAL; INFILTRATION; INTRACAUDAL; PERINEURAL AS NEEDED
Status: DISCONTINUED | OUTPATIENT
Start: 2024-04-19 | End: 2024-04-19

## 2024-04-19 RX ORDER — SODIUM CHLORIDE, SODIUM LACTATE, POTASSIUM CHLORIDE, CALCIUM CHLORIDE 600; 310; 30; 20 MG/100ML; MG/100ML; MG/100ML; MG/100ML
50 INJECTION, SOLUTION INTRAVENOUS CONTINUOUS
Status: DISCONTINUED | OUTPATIENT
Start: 2024-04-19 | End: 2024-04-23 | Stop reason: HOSPADM

## 2024-04-19 RX ADMIN — PROPOFOL 30 MG: 10 INJECTION, EMULSION INTRAVENOUS at 13:47

## 2024-04-19 RX ADMIN — SODIUM CHLORIDE, SODIUM LACTATE, POTASSIUM CHLORIDE, CALCIUM CHLORIDE 50 ML/HR: 600; 310; 30; 20 INJECTION, SOLUTION INTRAVENOUS at 13:26

## 2024-04-19 RX ADMIN — PROPOFOL 100 MG: 10 INJECTION, EMULSION INTRAVENOUS at 13:45

## 2024-04-19 RX ADMIN — FENTANYL CITRATE 25 MCG: 50 INJECTION, SOLUTION INTRAMUSCULAR; INTRAVENOUS at 13:44

## 2024-04-19 RX ADMIN — LIDOCAINE HYDROCHLORIDE 100 MG: 20 INJECTION, SOLUTION EPIDURAL; INFILTRATION; INTRACAUDAL; PERINEURAL at 13:45

## 2024-04-19 RX ADMIN — PROPOFOL 20 MG: 10 INJECTION, EMULSION INTRAVENOUS at 13:48

## 2024-04-19 NOTE — ANESTHESIA PREPROCEDURE EVALUATION
Procedure:  EGD    Relevant Problems   ANESTHESIA (within normal limits)      CARDIO (within normal limits)      ENDO (within normal limits)      GI/HEPATIC   (+) Gastroesophageal reflux disease      /RENAL (within normal limits)      GYN (within normal limits)      HEMATOLOGY (within normal limits)      MUSCULOSKELETAL (within normal limits)      NEURO/PSYCH (within normal limits)      PULMONARY (within normal limits)        Physical Exam    Airway    Mallampati score: I  TM Distance: >3 FB  Neck ROM: full     Dental   No notable dental hx     Cardiovascular      Pulmonary      Other Findings  post-pubertal.      Anesthesia Plan  ASA Score- 1     Anesthesia Type- IV sedation with anesthesia with ASA Monitors.         Additional Monitors:     Airway Plan:            Plan Factors-Exercise tolerance (METS): >4 METS.    Chart reviewed.    Patient summary reviewed.    Patient is a current smoker (recreational marijuana).              Induction- intravenous.    Postoperative Plan-     Informed Consent- Anesthetic plan and risks discussed with patient.  I personally reviewed this patient with the CRNA. Discussed and agreed on the Anesthesia Plan with the CRNA..

## 2024-04-19 NOTE — H&P
History and Physical -  [facility]  Linda Esposito 23 y.o. female MRN: [id number]    HPI: Linda Esposito is a 23 y.o. year old female who presents for GERD    REVIEW OF SYSTEMS: Per the HPI, and otherwise unremarkable.    Historical Information   Past Medical History:   Diagnosis Date    Anxiety     GERD (gastroesophageal reflux disease) 1 Jan 2024    Irritable bowel syndrome 2013     Past Surgical History:   Procedure Laterality Date    APPENDECTOMY  04/13/2021    WISDOM TOOTH EXTRACTION  2016     Social History   Social History     Substance and Sexual Activity   Alcohol Use Not Currently    Comment: Currently not drinking due to my medication and GI issues     Social History     Substance and Sexual Activity   Drug Use Yes    Frequency: 1.0 times per week    Types: Marijuana     Social History     Tobacco Use   Smoking Status Never   Smokeless Tobacco Never   Tobacco Comments    Used a vape in college for a few months, have not in years     Family History   Problem Relation Age of Onset    Anxiety disorder Mother     Mental illness Mother         DANIEL    Hyperlipidemia Father     Polycystic ovary syndrome Sister     No Known Problems Brother     Heart disease Maternal Grandmother     Obesity Maternal Grandmother     Lung cancer Maternal Grandfather     Alzheimer's disease Maternal Grandfather     Leukemia Paternal Grandfather     Breast cancer Paternal Aunt     Uterine cancer Neg Hx     Ovarian cancer Neg Hx     Colon cancer Neg Hx     Colon polyps Neg Hx        Meds/Allergies       Current Outpatient Medications:     desvenlafaxine succinate (PRISTIQ) 50 mg 24 hr tablet    famotidine (PEPCID) 40 MG tablet    hydrOXYzine HCL (ATARAX) 25 mg tablet    hyoscyamine (LEVSIN/SL) 0.125 mg SL tablet    escitalopram (LEXAPRO) 10 mg tablet    escitalopram (LEXAPRO) 5 mg tablet    norethindrone-ethinyl estradiol-iron (Cathy ARREDONDO 1.5/30) 1.5-30 MG-MCG tablet    Current Facility-Administered  "Medications:     lactated ringers infusion, 50 mL/hr, Intravenous, Continuous, 50 mL/hr at 04/19/24 1326    Allergies   Allergen Reactions    Morphine Rash       Objective     /89   Pulse 66   Temp (!) 96.5 °F (35.8 °C) (Temporal)   Resp 20   Ht 5' 6\" (1.676 m)   Wt 78.9 kg (174 lb)   SpO2 99%   BMI 28.08 kg/m²     PHYSICAL EXAM    Gen: NAD AAOx3  Head: Normocephalic, Atraumatic  CV: S1S2 RRR no m/r/g  CHEST: Clear b/l no c/r/w  ABD: soft, +BS NT/ND  EXT: no edema    ASSESSMENT/PLAN:  This is a 23 y.o. year old female here for EGD, and she is stable and optimized for her procedure.        "

## 2024-04-19 NOTE — ANESTHESIA POSTPROCEDURE EVALUATION
Post-Op Assessment Note    CV Status:  Stable  Pain Score: 0    Pain management: adequate       Mental Status:  Alert and awake   Hydration Status:  Euvolemic   PONV Controlled:  Controlled   Airway Patency:  Patent     Post Op Vitals Reviewed: Yes    No anethesia notable event occurred.    Staff: CRNA               BP   112/78   Temp 97.2   Pulse 60   Resp 12   SpO2 100

## 2024-04-23 PROCEDURE — 88305 TISSUE EXAM BY PATHOLOGIST: CPT | Performed by: PATHOLOGY

## 2024-04-24 ENCOUNTER — TELEPHONE (OUTPATIENT)
Dept: GASTROENTEROLOGY | Facility: CLINIC | Age: 24
End: 2024-04-24

## 2024-04-24 NOTE — TELEPHONE ENCOUNTER
Reviewed EGD biopsy results with patient.    Belching persist.  Please arrange breath test for small intestinal bacterial overgrowth.

## 2024-04-26 ENCOUNTER — ANNUAL EXAM (OUTPATIENT)
Dept: OBGYN CLINIC | Facility: CLINIC | Age: 24
End: 2024-04-26
Payer: COMMERCIAL

## 2024-04-26 VITALS
SYSTOLIC BLOOD PRESSURE: 118 MMHG | HEIGHT: 66 IN | WEIGHT: 170 LBS | BODY MASS INDEX: 27.32 KG/M2 | DIASTOLIC BLOOD PRESSURE: 78 MMHG

## 2024-04-26 DIAGNOSIS — Z01.419 WELL WOMAN EXAM: Primary | ICD-10-CM

## 2024-04-26 PROCEDURE — S0612 ANNUAL GYNECOLOGICAL EXAMINA: HCPCS | Performed by: OBSTETRICS & GYNECOLOGY

## 2024-04-26 NOTE — PROGRESS NOTES
[facility] - [address], [address], [place] [address]    ASSESSMENT/PLAN: Linda Esposito is a 23 y.o.  who presents for annual gynecologic exam.    Encounter for routine gynecologic examination  - Routine well woman exam completed today.  - Cervical Cancer Screening: Current ASCCP Guidelines reviewed. Last Pap: 2021 . History of abnormal: denies  - HPV Vaccination status: Immunization series complete  - STI screening offered including HIV testing: offered, pt declined  - Contraceptive counseling discussed.  Current contraception: oral contraceptives (estrogen/progesterone), however desires to stop: She desires to see how her menses is without OCPs. Will use condoms if needed.   - The following were reviewed in today's visit: breast self exam, adequate intake of calcium and vitamin D, exercise, and healthy diet    Additional problems addressed during this visit:  1. Well woman exam  -     IGP, rfx Aptima HPV ASCU        CC:  Annual Gynecologic Examination    HPI: Linda Esposito is a 23 y.o.  who presents for annual gynecologic examination. She reports no complaints. She is due for her PAP smear.     ROS: Negative except as noted in HPI    Patient's last menstrual period was 04/10/2024.       She  reports being sexually active and has had partner(s) who are male.       The following portions of the patient's history were reviewed and updated as appropriate:   Past Medical History:   Diagnosis Date    Anxiety     GERD (gastroesophageal reflux disease) 2024    Irritable bowel syndrome      Past Surgical History:   Procedure Laterality Date    APPENDECTOMY  2021    WISDOM TOOTH EXTRACTION  2016     Family History   Problem Relation Age of Onset    Anxiety disorder Mother     Mental illness Mother         DANIEL    Hyperlipidemia Father     Polycystic ovary syndrome Sister     No Known Problems Brother     Heart disease Maternal Grandmother      "Obesity Maternal Grandmother     Lung cancer Maternal Grandfather     Alzheimer's disease Maternal Grandfather     Leukemia Paternal Grandfather     Breast cancer Paternal Aunt     Uterine cancer Neg Hx     Ovarian cancer Neg Hx     Colon cancer Neg Hx     Colon polyps Neg Hx      Social History     Socioeconomic History    Marital status: Single     Spouse name: None    Number of children: None    Years of education: None    Highest education level: None   Occupational History    Occupation: Student   Tobacco Use    Smoking status: Never    Smokeless tobacco: Never    Tobacco comments:     Used a vape in college for a few months, have not in years   Vaping Use    Vaping status: Former   Substance and Sexual Activity    Alcohol use: Not Currently     Comment: Currently not drinking due to my medication and GI issues    Drug use: Yes     Frequency: 1.0 times per week     Types: Marijuana    Sexual activity: Yes     Partners: Male     Comment: no new partners   Other Topics Concern    None   Social History Narrative    Exercises up to 5 times per week    No domestic violence    No sexual abuse     Social Determinants of Health     Financial Resource Strain: Not on file   Food Insecurity: Not on file   Transportation Needs: Not on file   Physical Activity: Not on file   Stress: Not on file   Social Connections: Not on file   Intimate Partner Violence: Not on file   Housing Stability: Not on file     Outpatient Medications Marked as Taking for the 4/26/24 encounter (Annual Exam) with Noemy HOGUE DO   Medication    desvenlafaxine succinate (PRISTIQ) 50 mg 24 hr tablet    famotidine (PEPCID) 40 MG tablet    hydrOXYzine HCL (ATARAX) 25 mg tablet    hyoscyamine (LEVSIN/SL) 0.125 mg SL tablet     Allergies   Allergen Reactions    Morphine Rash           Objective:  /78 (BP Location: Left arm, Patient Position: Sitting, Cuff Size: Standard)   Ht 5' 6\" (1.676 m)   Wt 77.1 kg (170 lb)   LMP 04/10/2024   BMI " 27.44 kg/m²        Chaperone present? Pt declined    General Appearance: alert and oriented, in no acute distress.   Respiratory: Unlabored breathing.  Abdomen: Soft, non-tender, non-distended, no masses, no rebound or guarding.  Breast Exam: No dimpling, nipple retraction or discharge. No lumps or masses. No axillary or supraclavicular nodes.   Pelvic:   External genitalia: Normal appearance, no abnormal pigmentation, no lesions or masses. Normal Bartholin's and [place]'s.      Urinary system: Urethral meatus normal,       Bladder non-tender.      Vaginal: normal mucosa without prolapse or lesions. Normal-appearing physiologic discharge.      Cervix: Normal-appearing, well-epithelialized, no gross lesions or masses. No cervical motion tenderness.      Adnexa: No adnexal masses or tenderness noted.      Uterus: Normal-sized, regular contour, midline, mobile, no uterine tenderness.  Extremities: Normal range of motion. Warm, well-perfused, non-tender.   Skin: normal, no rash or abnormalities  Neurologic: alert, oriented x3  Psychiatric: Appropriate affect, mood stable, cooperative with exam.        Noemy Sethi DO  4/26/2024 1:38 PM

## 2024-05-01 LAB
CYTOLOGIST CVX/VAG CYTO: NORMAL
DX ICD CODE: NORMAL
OTHER STN SPEC: NORMAL
OTHER STN SPEC: NORMAL
PATH REPORT.FINAL DX SPEC: NORMAL
SL AMB NOTE:: NORMAL
SL AMB SPECIMEN ADEQUACY: NORMAL
SL AMB TEST METHODOLOGY: NORMAL

## 2024-06-17 ENCOUNTER — NURSE TRIAGE (OUTPATIENT)
Age: 24
End: 2024-06-17

## 2024-06-17 NOTE — TELEPHONE ENCOUNTER
Called pt and she is feeling better, still has some abdominal pain and is feeling tired. Pt decided against going to ER, has not vomited since this morning. Told pt to call us with any changes and or if she needs anything. Pt understood and said she will be going for blood work on Friday as well.

## 2024-06-17 NOTE — TELEPHONE ENCOUNTER
Please advise   Last OV: 3/13/24   EGD- 4/19/24   Hx: GERD, chest pain due to GERD, belching symptoms, IBS, abd cramping     Pt calling in, reports for the past 12 hours belching every 5 mins and diarrhea nonstop. She is asking for SIBO kit to complete.   She also wanted to discuss having colonoscopy done since she found out maternal grandmother had UC. Pt can discuss this at next OV.     Today had one episode of vomiting and reports it was all red. She denies eating anything dyed in red color or sauces. I advised to go to the ED to r/o upper GI bleed if it was blood she was vomiting up.   Pt will discuss with parents first         Reason for Disposition   Vomiting red blood or black (coffee ground) material    Protocols used: Vomiting-ADULT-OH

## 2024-06-17 NOTE — TELEPHONE ENCOUNTER
Vomiting + diarrhea suspicious for acute illness, although blood is concerning. Recommend ED evaluation for hematemesis.    SIBO testing / colonoscopy would be ordered at follow up office visit.

## 2024-06-18 ENCOUNTER — OFFICE VISIT (OUTPATIENT)
Age: 24
End: 2024-06-18
Payer: COMMERCIAL

## 2024-06-18 VITALS
SYSTOLIC BLOOD PRESSURE: 118 MMHG | DIASTOLIC BLOOD PRESSURE: 70 MMHG | BODY MASS INDEX: 26.65 KG/M2 | HEIGHT: 66 IN | WEIGHT: 165.8 LBS

## 2024-06-18 DIAGNOSIS — K58.0 IRRITABLE BOWEL SYNDROME WITH DIARRHEA: Primary | ICD-10-CM

## 2024-06-18 PROCEDURE — 99214 OFFICE O/P EST MOD 30 MIN: CPT | Performed by: NURSE PRACTITIONER

## 2024-06-18 RX ORDER — DICYCLOMINE HYDROCHLORIDE 10 MG/1
10 CAPSULE ORAL
Qty: 120 CAPSULE | Refills: 11 | Status: SHIPPED | OUTPATIENT
Start: 2024-06-18

## 2024-06-18 RX ORDER — PRAZOSIN HYDROCHLORIDE 1 MG/1
CAPSULE ORAL
COMMUNITY
Start: 2024-05-18

## 2024-06-18 NOTE — PROGRESS NOTES
[facility] - Outpatient Follow-up Note  Linda Esposito 24 y.o. female MRN: [id number]  Encounter: [id number]    ASSESSMENT AND PLAN:      1.  Irritable bowel syndrome with diarrhea  Presents today with 3 to 4-day history of frequent watery diarrhea with 3-4 bowel movements daily  Denies hematochezia or rectal bleeding.  Mild lower abdominal cramping  No improvement with hyoscyamine  No alarm symptoms  Suspect IBS flare but recommend breath test to rule out SIBO given recent frequent belching  -Check breath test for SIBO  -Trial of dicyclomine 10 mg-start with 1 tablet 4 times a day with meals and at bedtime for several days and then can use as needed if diarrhea improves  -Referral to nutrition/dietitian for counseling regarding IBS/low FODMAP diet  -No indication for colonoscopy today but would have low threshold to proceed with colonoscopy for further evaluation    2.  GERD  3.  Frequent belching  Recent EGD 4/2024 was normal, biopsies negative for H. pylori and EOE  Denies GERD symptoms but continues with frequent mild burping  No significant improvement with famotidine  -Check breath test to evaluate for possible SIBO  -Continue famotidine for now    Followup Appointment: Keep July appointment  ______________________________________________________________________    Chief Complaint   Patient presents with    Irritable Bowel Syndrome     Pt states she experiences chronic burping and having flare of IBS. She has significant watery diarrhea, decreased appetite and unable to eat normally. Yesterday vomited twice, small amount of blood mixed with food. She does not feel hyoscyamine and famotidine are working any more.     HPI: Patient presents in follow-up for recent flare of IBS symptoms.  Reports 3 to 4-day history of watery/loose bowel movements 3-4 times per day with associated lower abdominal cramping  Denies hematochezia or rectal bleeding  Some improvement with  Imodium and Pepto-Bismol reports frustration with needing to take medications frequently    Reports decreased appetite, afraid to eat as this often exacerbates her symptoms  Mild intermittent nausea and had episode of vomiting yesterday  Denies GERD symptoms but continues to report frequent belching  EGD 4/2024 normal, biopsies negative      Historical Information   Past Medical History:   Diagnosis Date    Anxiety     GERD (gastroesophageal reflux disease) 1 Jan 2024    Irritable bowel syndrome 2013     Past Surgical History:   Procedure Laterality Date    APPENDECTOMY  04/13/2021    UPPER GASTROINTESTINAL ENDOSCOPY  04/2024    WISDOM TOOTH EXTRACTION  2016     Social History     Substance and Sexual Activity   Alcohol Use Not Currently    Comment: Currently not drinking due to my medication and GI issues     Social History     Substance and Sexual Activity   Drug Use Yes    Frequency: 1.0 times per week    Types: Marijuana     Social History     Tobacco Use   Smoking Status Never    Passive exposure: Never   Smokeless Tobacco Never   Tobacco Comments    Used a vape in college for a few months, have not in years     Family History   Problem Relation Age of Onset    Anxiety disorder Mother     Mental illness Mother         DANIEL    Hyperlipidemia Father     Polycystic ovary syndrome Sister     No Known Problems Brother     Heart disease Maternal Grandmother     Obesity Maternal Grandmother     Ulcerative colitis Maternal Grandmother     Lung cancer Maternal Grandfather     Alzheimer's disease Maternal Grandfather     Leukemia Paternal Grandfather     Breast cancer Paternal Aunt     Uterine cancer Neg Hx     Ovarian cancer Neg Hx     Colon cancer Neg Hx     Colon polyps Neg Hx          Current Outpatient Medications:     desvenlafaxine succinate (PRISTIQ) 50 mg 24 hr tablet    famotidine (PEPCID) 40 MG tablet    hydrOXYzine HCL (ATARAX) 25 mg tablet    hyoscyamine (LEVSIN/SL) 0.125 mg SL tablet    escitalopram  "(LEXAPRO) 10 mg tablet    escitalopram (LEXAPRO) 5 mg tablet    norethindrone-ethinyl estradiol-iron (Catyh FE 1.5/30) 1.5-30 MG-MCG tablet    prazosin (MINIPRESS) 1 mg capsule  Allergies   Allergen Reactions    Morphine Rash     Reviewed medications and allergies and updated as indicated    PHYSICAL EXAM:    Blood pressure 118/70, height 5' 6\" (1.676 m), weight 75.2 kg (165 lb 12.8 oz), not currently breastfeeding. Body mass index is 26.76 kg/m².  General Appearance: NAD, cooperative, alert  Eyes: Anicteric  ENT:  Normocephalic, atraumatic, normal mucosa.    Neck:  Supple, symmetrical, trachea midline  Resp:  Clear to auscultation bilaterally; no rales, rhonchi or wheezing; respirations unlabored   CV:  S1 S2, Regular rate and rhythm; no murmur, rub, or gallop.  GI:  Soft, non-tender, non-distended; normal bowel sounds; no masses, no organomegaly   Rectal: Deferred  Musculoskeletal: No cyanosis, clubbing or edema. Normal ROM.  Skin:  No jaundice, rashes, or lesions   Psych: Normal affect, good eye contact  Neuro: No gross deficits, AAOx3      "

## 2024-07-08 ENCOUNTER — OFFICE VISIT (OUTPATIENT)
Age: 24
End: 2024-07-08
Payer: COMMERCIAL

## 2024-07-08 VITALS
BODY MASS INDEX: 26.16 KG/M2 | DIASTOLIC BLOOD PRESSURE: 72 MMHG | HEIGHT: 66 IN | WEIGHT: 162.8 LBS | SYSTOLIC BLOOD PRESSURE: 118 MMHG

## 2024-07-08 DIAGNOSIS — R14.2 BELCHING SYMPTOM: ICD-10-CM

## 2024-07-08 DIAGNOSIS — K21.9 GASTROESOPHAGEAL REFLUX DISEASE WITHOUT ESOPHAGITIS: ICD-10-CM

## 2024-07-08 DIAGNOSIS — K58.0 IRRITABLE BOWEL SYNDROME WITH DIARRHEA: Primary | ICD-10-CM

## 2024-07-08 PROCEDURE — 99213 OFFICE O/P EST LOW 20 MIN: CPT | Performed by: INTERNAL MEDICINE

## 2024-07-08 NOTE — LETTER
July 8, 2024     ANNA Maguire  [address] [facility]  [address]  [facility] [address]    Patient: Linda Esposito   YOB: 2000   Date of Visit: 7/8/2024       Dear Dr. Bentley:    Thank you for referring Linda Esposito to me for evaluation. Below are my notes for this consultation.    If you have questions, please do not hesitate to call me. I look forward to following your patient along with you.         Sincerely,        Mena Norris MD        CC: No Recipients    Mena Norris MD  7/8/2024  5:17 PM  Sign when Signing Visit  [facility] - Outpatient Follow-up Note  Linda Esposito 24 y.o. female MRN: [id number]  Encounter: [id number]    ASSESSMENT AND PLAN:      1. Irritable bowel syndrome with diarrhea  2. Belching symptom  3. Gastroesophageal reflux disease without esophagitis  Patient with multiple GI symptoms consistent with irritable bowel and reflux.  Reflux symptoms are reasonably stable with dietary control, no significant help with Pepcid.  IBS symptoms have been doing better again with dietary control and by using dicyclomine rather than hyoscyamine.  Still with significant belching.  Discussed phenomenon of air swallowing which likely is exacerbating symptoms.  Planning to proceed with lactulose breath test to assess for SIBO.  Reflux diet and precautions  Limit carbohydrates  Agree nutritional counseling  Try to limit air swallowing by avoiding use of straws, eating quickly and gulping, excessive talking while eating  Complete lactulose breath test      Followup Appointment: 3 to 4 months  ______________________________________________________________________    Chief Complaint   Patient presents with   • Follow-up     Pt is still having constant belching. Would like to discuss EGD results. Scheduled with nutritionist in August.     HPI:  Reviewed EGD results which was unremarkable.  Discussed lactulose breath test.   Minimal help with famotidine.  Switched from hyoscyamine to dicyclomine which seems to help the IBS symptoms.  No help with belching.  Going to see nutritionist.  Discussed air swallowing and techniques to limit that.    Historical Information  Past Medical History:   Diagnosis Date   • Anxiety    • GERD (gastroesophageal reflux disease) 1 Jan 2024   • Irritable bowel syndrome 2013     Past Surgical History:   Procedure Laterality Date   • APPENDECTOMY  04/13/2021   • UPPER GASTROINTESTINAL ENDOSCOPY  04/2024   • WISDOM TOOTH EXTRACTION  2016     Social History     Substance and Sexual Activity   Alcohol Use Not Currently    Comment: Currently not drinking due to my medication and GI issues     Social History     Substance and Sexual Activity   Drug Use Yes   • Frequency: 1.0 times per week   • Types: Marijuana     Social History     Tobacco Use   Smoking Status Never   • Passive exposure: Never   Smokeless Tobacco Never   Tobacco Comments    Used a vape in college for a few months, have not in years     Family History   Problem Relation Age of Onset   • Anxiety disorder Mother    • Mental illness Mother         DANIEL   • Hyperlipidemia Father    • Polycystic ovary syndrome Sister    • No Known Problems Brother    • Heart disease Maternal Grandmother    • Obesity Maternal Grandmother    • Ulcerative colitis Maternal Grandmother    • Lung cancer Maternal Grandfather    • Alzheimer's disease Maternal Grandfather    • Leukemia Paternal Grandfather    • Breast cancer Paternal Aunt    • Uterine cancer Neg Hx    • Ovarian cancer Neg Hx    • Colon cancer Neg Hx    • Colon polyps Neg Hx          Current Outpatient Medications:   •  desvenlafaxine succinate (PRISTIQ) 50 mg 24 hr tablet  •  dicyclomine (BENTYL) 10 mg capsule  •  famotidine (PEPCID) 40 MG tablet  •  hydrOXYzine HCL (ATARAX) 25 mg tablet  •  escitalopram (LEXAPRO) 10 mg tablet  •  escitalopram (LEXAPRO) 5 mg tablet  •  norethindrone-ethinyl estradiol-iron (Cathy  "FE 1.5/30) 1.5-30 MG-MCG tablet  •  prazosin (MINIPRESS) 1 mg capsule  Allergies   Allergen Reactions   • Morphine Rash     Reviewed medications and allergies and updated as indicated    PHYSICAL EXAM:    Blood pressure 118/72, height 5' 6\" (1.676 m), weight 73.8 kg (162 lb 12.8 oz), not currently breastfeeding. Body mass index is 26.28 kg/m².  General Appearance: NAD, cooperative, alert  Eyes: Anicteric, conjunctiva pink  ENT:  Normocephalic, atraumatic, normal mucosa.    Neck:  Supple, symmetrical, trachea midline  GI:  Soft, non-tender, non-distended; normal bowel sounds; no masses, no organomegaly   Rectal: Deferred  Musculoskeletal: No cyanosis, clubbing or edema. Normal ROM.  Skin:  No jaundice, rashes, or lesions   Psych: Normal affect, good eye contact  Neuro: No gross deficits, AAOx3    Lab Results:   No results found for: \"WBC\", \"HGB\", \"HCT\", \"MCV\", \"PLT\"  No results found for: \"NA\", \"K\", \"CL\", \"CO2\", \"ANIONGAP\", \"BUN\", \"CREATININE\", \"GLUCOSE\", \"GLUF\", \"CALCIUM\", \"CORRECTEDCA\", \"AST\", \"ALT\", \"ALKPHOS\", \"PROT\", \"BILITOT\", \"EGFR\"    Radiology Results:   No results found.    "

## 2024-07-08 NOTE — PROGRESS NOTES
[facility] - Outpatient Follow-up Note  Linda Esposito 24 y.o. female MRN: [id number]  Encounter: [id number]    ASSESSMENT AND PLAN:      1. Irritable bowel syndrome with diarrhea  2. Belching symptom  3. Gastroesophageal reflux disease without esophagitis  Patient with multiple GI symptoms consistent with irritable bowel and reflux.  Reflux symptoms are reasonably stable with dietary control, no significant help with Pepcid.  IBS symptoms have been doing better again with dietary control and by using dicyclomine rather than hyoscyamine.  Still with significant belching.  Discussed phenomenon of air swallowing which likely is exacerbating symptoms.  Planning to proceed with lactulose breath test to assess for SIBO.  Reflux diet and precautions  Limit carbohydrates  Agree nutritional counseling  Try to limit air swallowing by avoiding use of straws, eating quickly and gulping, excessive talking while eating  Complete lactulose breath test      Followup Appointment: 3 to 4 months  ______________________________________________________________________    Chief Complaint   Patient presents with    Follow-up     Pt is still having constant belching. Would like to discuss EGD results. Scheduled with nutritionist in August.     HPI:  Reviewed EGD results which was unremarkable.  Discussed lactulose breath test.  Minimal help with famotidine.  Switched from hyoscyamine to dicyclomine which seems to help the IBS symptoms.  No help with belching.  Going to see nutritionist.  Discussed air swallowing and techniques to limit that.    Historical Information   Past Medical History:   Diagnosis Date    Anxiety     GERD (gastroesophageal reflux disease) 1 Jan 2024    Irritable bowel syndrome 2013     Past Surgical History:   Procedure Laterality Date    APPENDECTOMY  04/13/2021    UPPER GASTROINTESTINAL ENDOSCOPY  04/2024    WISDOM TOOTH EXTRACTION  2016     Social History     Substance  "and Sexual Activity   Alcohol Use Not Currently    Comment: Currently not drinking due to my medication and GI issues     Social History     Substance and Sexual Activity   Drug Use Yes    Frequency: 1.0 times per week    Types: Marijuana     Social History     Tobacco Use   Smoking Status Never    Passive exposure: Never   Smokeless Tobacco Never   Tobacco Comments    Used a vape in college for a few months, have not in years     Family History   Problem Relation Age of Onset    Anxiety disorder Mother     Mental illness Mother         DANIEL    Hyperlipidemia Father     Polycystic ovary syndrome Sister     No Known Problems Brother     Heart disease Maternal Grandmother     Obesity Maternal Grandmother     Ulcerative colitis Maternal Grandmother     Lung cancer Maternal Grandfather     Alzheimer's disease Maternal Grandfather     Leukemia Paternal Grandfather     Breast cancer Paternal Aunt     Uterine cancer Neg Hx     Ovarian cancer Neg Hx     Colon cancer Neg Hx     Colon polyps Neg Hx          Current Outpatient Medications:     desvenlafaxine succinate (PRISTIQ) 50 mg 24 hr tablet    dicyclomine (BENTYL) 10 mg capsule    famotidine (PEPCID) 40 MG tablet    hydrOXYzine HCL (ATARAX) 25 mg tablet    escitalopram (LEXAPRO) 10 mg tablet    escitalopram (LEXAPRO) 5 mg tablet    norethindrone-ethinyl estradiol-iron (Cathy FE 1.5/30) 1.5-30 MG-MCG tablet    prazosin (MINIPRESS) 1 mg capsule  Allergies   Allergen Reactions    Morphine Rash     Reviewed medications and allergies and updated as indicated    PHYSICAL EXAM:    Blood pressure 118/72, height 5' 6\" (1.676 m), weight 73.8 kg (162 lb 12.8 oz), not currently breastfeeding. Body mass index is 26.28 kg/m².  General Appearance: NAD, cooperative, alert  Eyes: Anicteric, conjunctiva pink  ENT:  Normocephalic, atraumatic, normal mucosa.    Neck:  Supple, symmetrical, trachea midline  GI:  Soft, non-tender, non-distended; normal bowel sounds; no masses, no organomegaly " "  Rectal: Deferred  Musculoskeletal: No cyanosis, clubbing or edema. Normal ROM.  Skin:  No jaundice, rashes, or lesions   Psych: Normal affect, good eye contact  Neuro: No gross deficits, AAOx3    Lab Results:   No results found for: \"WBC\", \"HGB\", \"HCT\", \"MCV\", \"PLT\"  No results found for: \"NA\", \"K\", \"CL\", \"CO2\", \"ANIONGAP\", \"BUN\", \"CREATININE\", \"GLUCOSE\", \"GLUF\", \"CALCIUM\", \"CORRECTEDCA\", \"AST\", \"ALT\", \"ALKPHOS\", \"PROT\", \"BILITOT\", \"EGFR\"    Radiology Results:   No results found.    "

## 2024-07-08 NOTE — PATIENT INSTRUCTIONS
Reflux diet and precautions  Limit carbohydrates  Agree nutritional counseling  Try to limit air swallowing by avoiding use of straws, eating quickly and gulping, excessive talking while eating  Complete lactulose breath test

## 2024-07-10 DIAGNOSIS — K58.0 IRRITABLE BOWEL SYNDROME WITH DIARRHEA: ICD-10-CM

## 2024-07-11 RX ORDER — DICYCLOMINE HYDROCHLORIDE 10 MG/1
CAPSULE ORAL
Qty: 400 CAPSULE | Refills: 1 | Status: SHIPPED | OUTPATIENT
Start: 2024-07-11

## 2024-08-15 ENCOUNTER — CLINICAL SUPPORT (OUTPATIENT)
Dept: NUTRITION | Facility: HOSPITAL | Age: 24
End: 2024-08-15
Payer: COMMERCIAL

## 2024-08-15 VITALS — BODY MASS INDEX: 25.82 KG/M2 | WEIGHT: 160 LBS

## 2024-08-15 DIAGNOSIS — K58.0 IRRITABLE BOWEL SYNDROME WITH DIARRHEA: ICD-10-CM

## 2024-08-15 PROCEDURE — 97802 MEDICAL NUTRITION INDIV IN: CPT | Performed by: DIETITIAN, REGISTERED

## 2024-08-15 NOTE — PROGRESS NOTES
" Nutrition Assessment Form    Patient Name: Linda Esposito    YOB: 2000    Sex: Female     Assessment Date: 8/15/2024  Start Time: 1 Stop Time: 2 Total Minutes: 60     Data:  Present at session: self   Parent/Patient Concerns/reason for visit: \"I have a lot of stomach problems since I was a child- they come and go\"   Medical Dx/Reason for Referral: IBS w/Diarrhea   Past Medical History:   Diagnosis Date    Anxiety     GERD (gastroesophageal reflux disease) 1 Jan 2024    Irritable bowel syndrome 2013       Current Outpatient Medications   Medication Sig Dispense Refill    desvenlafaxine succinate (PRISTIQ) 50 mg 24 hr tablet Take 50 mg by mouth daily      dicyclomine (BENTYL) 10 mg capsule TAKE 1 CAPSULE BY MOUTH 4 TIMES A DAY (BEFORE MEALS AND AT BEDTIME) 400 capsule 1    escitalopram (LEXAPRO) 10 mg tablet Take 10 mg by mouth daily        escitalopram (LEXAPRO) 5 mg tablet TAKE 1 TABLET BY MOUTH DAILY. TAKE WITH 10 MG = 15 MG DAILY      famotidine (PEPCID) 40 MG tablet TAKE 1 TABLET BY MOUTH EVERY DAY 90 tablet 1    hydrOXYzine HCL (ATARAX) 25 mg tablet TAKE 1 TO 2 TABLETS BY MOUTH IN THE EVENING AS NEEDED FOR ANXIETY      norethindrone-ethinyl estradiol-iron (Cathy FE 1.5/30) 1.5-30 MG-MCG tablet Take 1 tablet by mouth daily (Patient not taking: Reported on 4/26/2024) 84 tablet 4    prazosin (MINIPRESS) 1 mg capsule TAKE 1 CAPSULE BY MOUTH AT BEDTIME X 1 WEEK, IF NEEDED, INCREASE TO 2 MG IF NEEDED FOR NIGHTMARES. (Patient not taking: Reported on 6/18/2024)       No current facility-administered medications for this visit.        Additional Meds/Supplements: N/a   Special Learning Needs/barriers to learning/any new barriers N/a   Height: Ht Readings from Last 5 Encounters:   07/08/24 5' 6\" (1.676 m)   06/18/24 5' 6\" (1.676 m)   04/26/24 5' 6\" (1.676 m)   04/19/24 5' 6\" (1.676 m)   03/13/24 5' 6\" (1.676 m)      Weight: Wt Readings from Last 10 Encounters:   07/08/24 73.8 kg (162 lb 12.8 oz) " "  06/18/24 75.2 kg (165 lb 12.8 oz)   04/26/24 77.1 kg (170 lb)   04/19/24 78.9 kg (174 lb)   03/13/24 78.9 kg (174 lb)   02/14/23 78.6 kg (173 lb 3.2 oz)   12/23/21 66.2 kg (146 lb)   11/24/21 62 kg (136 lb 9.6 oz)   07/02/21 64.4 kg (142 lb)     Estimated body mass index is 26.28 kg/m² as calculated from the following:    Height as of 7/8/24: 5' 6\" (1.676 m).    Weight as of 7/8/24: 73.8 kg (162 lb 12.8 oz).   Recent Weight Change: [x]Yes     []No  Amount: 12# loss x 4 months-  not intentional      Energy Needs: N/a   Allergies   Allergen Reactions    Morphine Rash    or intolerances NKFA   Social History     Substance and Sexual Activity   Alcohol Use Not Currently    Comment: Currently not drinking due to my medication and GI issues    N/a   Social History     Tobacco Use   Smoking Status Never    Passive exposure: Never   Smokeless Tobacco Never   Tobacco Comments    Used a vape in college for a few months, have not in years    Smoke marijuana once every couple of days   Who shops? 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 []Yes     [x]No  When:      Why:         Diet Hx: coffee- 3-4 x /wk  Breakfast: Diet:  x 3 weeks - 6-8 banana and yogurt- " activia or chobani drinks  Water- 48-96oz daily-  may skip 1-2x/wk if skips will eat 1 pk 6 peanut butter crackers   Lunch: 11-1-  lunch meat s/w of PB and J 12 grain bread with fruit (berries or grapes) maybe some thin pretzels and square kind bars     Dinner: 5-7  meat -chicken or burger vegetables and sometimes carb but not always          Snacks: AM -   PM -   HS -    Other Notes/ Initial Assessment:  Linda is here because she has issues with her stomach- she has had issues with diarrhea/stomach ever since she was a kid.  She also has generalized anxiety disorder. Her weight has fluctuated with the highest she has been at 180#-she is not concerned with her weight per se at this point, though she is aware she has lost some. She will go days at a time without eating because she is afraid to trigger symptoms.   She has been trying to cut back on sugar and processed foods and feels even when she eats healthy she can still have issues or not feel well.  She wants to maximize her calories and sometimes barely eats 1-2 meals day.  She knows some of her triggers (including caffeine) but is overwhelmed with FODMAP diet, work, life and school.  Her brother and father also have stomach issues.  She does feel constant stomach pain. She is taking a medication for anxiety. She also has 10 hour days and works in a very stressful environment.  She has tried counseling previously but it has always been online and she does not like that.    Discussed appropriate meals, snacks, fluids and quantities, portions, discussed trying GF diet for 2 weeks and no caffeine to see how she felt, then potentially trying Low FODMAP diet as a last resort.  Discussed the importance of adequate sleep and activity in managing her stress and anxiety and taking some time to plan and prep her meals again to decrease her stress. Also suggested trialing a food diary with symptoms to help pinpoint more food triggers.     Provided information on Celiac  Disease, LOW FODMAP diet, and IBS from Nutrition Care Manual and RD name and number for home use.  Follow up set for one month.    Updated assessment (Follow up note only):       Nutrition Diagnosis:   Altered gastrointestinal function  related to Alteration in gastrointestinal tract structure and/or function as evidenced by  Avoidance or limitation of estimated total intake or intake of specific foods/food groups due to GI symptoms       Any change or new dx since previous visit:     Nutrition Diagnosis:   Overweight/obesity  related to Excess energy intake as evidenced by  BMI more than normative standard for age and sex (overweight 25-29.9)      Medical Nutrition Therapy Intervention:  [x]Individualized Meal Plan-Discussed the importance of eating 3 meals daily and not skipping, and how metabolism is affected.  Also discussed adding in small snacks or 5-6 small meals daily if desired vs 3 larger ones, and appropriate options and portions.  Appropriate timing of meals, including eating within 1 hr of waking and eating meals slowly 20mins/meals, minimizing mindless/boredom or habitual eating, etc was also mentioned.  Discussed the plate method of portioning foods, including half a plate fruits and vegetables or a half plate all vegetables, 1/4 of the plate a lean protein source or meat, and a 1/4 of the plate being a whole grain carb- usually 1/2-1c.  This should be followed for at least 2 meals of the day, but could also be followed for all 3.Fluid intake discussed and appropriate amounts and choices, 16 oz with meals and additional 32oz during the day.  S/S dehydration also covered. []Understanding Lab Values   []Basic Pathophysiology of Disease []Food/Medication Interactions   []Food Diary [x]Exercise-150 mins of moderate activity weekly, discussed importance of variety of activity, including wt bearing activities 2-3x/wk x 10-15mins. Discussed importance of activity throughout the lifecycle and its impact on  "overall health/stress management, etc.   [x]Lifestyle/Behavior Modification Techniques-Discussed the importance of adequate sleep, and ideal sleeping conditions, including a cool temperature 64-68 degrees F, and a dark and quiet room. Also discussed the importance of a regular and consistent sleep routine, including minimizing blue light exposure an hour before sleeping.  Weekend habits should include staying fairly consistent with weekday sleep habits to minimize disruption during the week.  A connection was made between getting adequate and good quality sleep and the ability to handle stress the next day, make healthy food choices, and be active. []Medication, Mechanism of Action   []Label Reading: CHO/ Na/ Fat/ other_________ []Self Blood Glucose Monitoring   []Weight/BMI Goals: gain/lose/maintain []Other -           Comprehension: []Excellent  []Very Good  [x]Good  []Fair   []Poor    Receptivity: []Excellent  []Very Good  [x]Good  []Fair   []Poor    Expected Compliance: []Excellent  []Very Good  [x]Good  []Fair   []Poor        Goals (initial)/ Progress made on previous goals/new goals:  1.3 meals daily no skipping   2. 64oz water daily   3.  Try caffeine free and gluten free x 2 weeks       No follow-ups on file.  Labs:  CMP  No results found for: \"NA\", \"K\", \"CL\", \"CO2\", \"ANIONGAP\", \"BUN\", \"CREATININE\", \"GLUCOSE\", \"GLUF\", \"CALCIUM\", \"CORRECTEDCA\", \"AST\", \"ALT\", \"ALKPHOS\", \"PROT\", \"BILITOT\", \"EGFR\"    BMP  No results found for: \"GLUCOSE\", \"CALCIUM\", \"NA\", \"K\", \"CO2\", \"CL\", \"BUN\", \"CREATININE\"    Lipids  No results found for: \"CHOL\"  No results found for: \"HDL\"  No results found for: \"LDLCALC\"  No results found for: \"TRIG\"  No results found for: \"CHOLHDL\"    Hemoglobin A1C  No results found for: \"HGBA1C\"    Fasting Glucose  No results found for: \"GLUF\"    Insulin     Thyroid  No results found for: \"TSH\", \"E8VPIBC\", \"F7LSDWR\", \"THYROIDAB\"    Hepatic Function Panel  No results found for: \"ALT\", \"AST\", \"GGT\", " "\"ALKPHOS\", \"BILITOT\"    Celiac Disease Antibody Panel  No results found for: \"ENDOMYSIAL IGA\", \"GLIADIN IGA\", \"GLIADIN IGG\", \"IGA\", \"TISSUE TRANSGLUT AB\", \"TTG IGA\"   Iron  No results found for: \"IRON\", \"TIBC\", \"FERRITIN\"         Zelda Kiser RD  [facility]  [address] [address]    "

## 2025-05-05 ENCOUNTER — ANNUAL EXAM (OUTPATIENT)
Dept: OBGYN CLINIC | Facility: CLINIC | Age: 25
End: 2025-05-05
Payer: COMMERCIAL

## 2025-05-05 VITALS
SYSTOLIC BLOOD PRESSURE: 104 MMHG | WEIGHT: 143 LBS | BODY MASS INDEX: 22.98 KG/M2 | HEIGHT: 66 IN | DIASTOLIC BLOOD PRESSURE: 60 MMHG

## 2025-05-05 DIAGNOSIS — Z01.419 WELL WOMAN EXAM: Primary | ICD-10-CM

## 2025-05-05 PROCEDURE — S0612 ANNUAL GYNECOLOGICAL EXAMINA: HCPCS | Performed by: OBSTETRICS & GYNECOLOGY

## 2025-05-05 NOTE — PROGRESS NOTES
[facility] [address], [address], [place] [address]    ASSESSMENT/PLAN: Linda Esposito is a 25 y.o.  who presents for annual gynecologic exam.    Encounter for routine gynecologic examination  - Routine well woman exam completed today.  - Cervical Cancer Screening: Current ASCCP Guidelines reviewed. Last Pap: 2024 . History of abnormal: denies  - HPV Vaccination status: Immunization series complete  - STI screening offered including HIV testing: offered, pt declined  - Contraceptive counseling discussed.  Current contraception: natural family planning (NFP):   - The following were reviewed in today's visit: breast self exam, adequate intake of calcium and vitamin D, exercise, and healthy diet    Additional problems addressed during this visit:  1. Well woman exam      CC:  Annual Gynecologic Examination    HPI: Linda Esposito is a 25 y.o.  who presents for annual gynecologic examination. She reports no complaints. She feels good. She reports a 39 day cycle.     ROS: Negative except as noted in HPI    Patient's last menstrual period was 2025.       She  reports being sexually active and has had partner(s) who are male. She reports using the following method of birth control/protection: Other.       The following portions of the patient's history were reviewed and updated as appropriate:   Past Medical History:   Diagnosis Date    Anxiety     GERD (gastroesophageal reflux disease) 2024    Irritable bowel syndrome      Past Surgical History:   Procedure Laterality Date    APPENDECTOMY  2021    UPPER GASTROINTESTINAL ENDOSCOPY  2024    WISDOM TOOTH EXTRACTION  2016     Family History   Problem Relation Age of Onset    Anxiety disorder Mother     Mental illness Mother         DANIEL    Hyperlipidemia Father     Polycystic ovary syndrome Sister     No Known Problems Brother     Heart disease Maternal Grandmother     Obesity Maternal  "Grandmother     Ulcerative colitis Maternal Grandmother     Lung cancer Maternal Grandfather     Alzheimer's disease Maternal Grandfather     Leukemia Paternal Grandfather     Breast cancer Paternal Aunt     Uterine cancer Neg Hx     Ovarian cancer Neg Hx     Colon cancer Neg Hx     Colon polyps Neg Hx      Social History     Socioeconomic History    Marital status: Single     Spouse name: None    Number of children: None    Years of education: None    Highest education level: None   Occupational History    Occupation: Student   Tobacco Use    Smoking status: Never     Passive exposure: Never    Smokeless tobacco: Never    Tobacco comments:     Used a vape in college for a few months, have not in years   Vaping Use    Vaping status: Former   Substance and Sexual Activity    Alcohol use: Not Currently     Comment: Currently not drinking due to my medication and GI issues    Drug use: Yes     Frequency: 1.0 times per week     Types: Marijuana    Sexual activity: Yes     Partners: Male     Birth control/protection: Other     Comment: no new partners   Other Topics Concern    None   Social History Narrative    Exercises up to 5 times per week    No domestic violence    No sexual abuse     Social Drivers of Health     Financial Resource Strain: Not on file   Food Insecurity: Not on file   Transportation Needs: Not on file   Physical Activity: Not on file   Stress: Not on file   Social Connections: Not on file   Intimate Partner Violence: Not on file   Housing Stability: Not on file     Outpatient Medications Marked as Taking for the 5/5/25 encounter (Annual Exam) with Noemy HOGUE DO   Medication    desvenlafaxine succinate (PRISTIQ) 50 mg 24 hr tablet    dicyclomine (BENTYL) 10 mg capsule     Allergies   Allergen Reactions    Morphine Rash           Objective:  /60 (BP Location: Right arm, Patient Position: Sitting, Cuff Size: Standard)   Ht 5' 6\" (1.676 m)   Wt 64.9 kg (143 lb)   LMP 04/27/2025   BMI " 23.08 kg/m²        Chaperone present? Pt declined    General Appearance: alert and oriented, in no acute distress.   Respiratory: Unlabored breathing.  Abdomen: Soft, non-tender, non-distended, no masses, no rebound or guarding.  Breast Exam: No dimpling, nipple retraction or discharge. No lumps or masses. No axillary or supraclavicular nodes.   Pelvic:   External genitalia: Normal appearance, no abnormal pigmentation, no lesions or masses. Normal Bartholin's and [place]'s.      Urinary system: Urethral meatus normal,       Bladder non-tender.      Vaginal: normal mucosa without prolapse or lesions. Normal-appearing physiologic discharge.      Cervix: Normal-appearing, well-epithelialized, no gross lesions or masses. No cervical motion tenderness.      Adnexa: No adnexal masses or tenderness noted.      Uterus: Normal-sized, regular contour, midline, mobile, no uterine tenderness.  Extremities: Normal range of motion. Warm, well-perfused, non-tender.   Skin: normal, no rash or abnormalities  Neurologic: alert, oriented x3  Psychiatric: Appropriate affect, mood stable, cooperative with exam.        Noemy Sethi DO  5/5/2025 10:15 AM

## 2025-07-18 ENCOUNTER — TELEMEDICINE (OUTPATIENT)
Dept: OTHER | Facility: HOSPITAL | Age: 25
End: 2025-07-18
Payer: COMMERCIAL

## 2025-07-18 DIAGNOSIS — L08.9 INFECTED PIERCED EAR, LEFT, INITIAL ENCOUNTER: Primary | ICD-10-CM

## 2025-07-18 DIAGNOSIS — S01.332A INFECTED PIERCED EAR, LEFT, INITIAL ENCOUNTER: Primary | ICD-10-CM

## 2025-07-18 PROCEDURE — 99213 OFFICE O/P EST LOW 20 MIN: CPT | Performed by: PHYSICIAN ASSISTANT

## 2025-07-18 RX ORDER — CEPHALEXIN 500 MG/1
500 CAPSULE ORAL 2 TIMES DAILY
Qty: 14 CAPSULE | Refills: 0 | Status: SHIPPED | OUTPATIENT
Start: 2025-07-18 | End: 2025-07-25

## 2025-07-18 NOTE — PATIENT INSTRUCTIONS
"If the redness spreads after 2 days, you develop fevers/chills, worsening pain, or streaking redness, you should go to the emergency department for evaluation. Please schedule a follow-up with your primary care provider for re-evaluation early next week if no improvement.    [facility] Phone number is [phone] if you need assistance or have further questions  Notes for work/school can be found in the \"Letters\" section of [employer]    "

## 2025-07-18 NOTE — PROGRESS NOTES
Virtual Regular Visit  Name: Linda Esposito      : 2000      MRN: [id number]  Encounter Provider: Shannon D Severino, PA-C  Encounter Date: 2025   Encounter department: VIRTUAL CARE       Verification of patient location:  Patient is located at Other in the following state in which I hold an active license [place] :  Assessment & Plan  Infected pierced ear, left, initial encounter    Orders:    cephalexin (KEFLEX) 500 mg capsule; Take 1 capsule (500 mg total) by mouth in the morning and 1 capsule (500 mg total) before bedtime. Do all this for 7 days.        Encounter provider Shannon D Severino, PA-C    The patient was identified by name and date of birth. Linda Esposito was informed that this is a telemedicine visit and that the visit is being conducted through the Epic Embedded platform. She agrees to proceed..  My office door was closed. No one else was in the room. She acknowledged consent and understanding of privacy and security of the video platform. The patient has agreed to participate and understands they can discontinue the visit at any time.    Patient is aware this is a billable service.     History obtained from: patient  History of Present Illness     Pt reports She got her ear pierced 8 days ago. Reports it got infected. 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 Has redness, swelling, ttp. Using saline flushes to clean the ear. No fevers      Review of Systems   Constitutional:  Negative for fever.   HENT:  Negative for nosebleeds.    Eyes:  Negative for redness.   Respiratory:  Negative for shortness of breath.    Cardiovascular:  Negative for chest pain.   Gastrointestinal:  Negative for blood in stool.   Genitourinary:  Negative for hematuria.   Musculoskeletal:  Negative for gait problem.   Skin:  Positive for color change. Negative for rash.   Neurological:  Negative for seizures.   Psychiatric/Behavioral:  Negative for behavioral problems.         Objective   There were no vitals taken for this visit.    Physical Exam  Constitutional:       General: She is not in acute distress.     Appearance: Normal appearance. She is not toxic-appearing.   HENT:      Head: Normocephalic and atraumatic.      Ears:        Nose: No rhinorrhea.      Mouth/Throat:      Mouth: Mucous membranes are moist.     Eyes:      Conjunctiva/sclera: Conjunctivae normal.     Pulmonary:      Effort: Pulmonary effort is normal. No respiratory distress.      Breath sounds: No wheezing (no gross audible wheeze through computer).     Musculoskeletal:      Cervical back: Normal range of motion.     Skin:     Findings: No rash (on face or neck).     Neurological:      Mental Status: She is alert.      Cranial Nerves: No dysarthria or facial asymmetry.     Psychiatric:         Mood and Affect: Mood normal.         Behavior: Behavior normal.         Visit Time  Total Visit Duration: 5 minutes not including the time spent for establishing the audio/video connection.

## 2025-08-01 PROBLEM — F33.0 MAJOR DEPRESSIVE DISORDER, RECURRENT, MILD (HCC): Status: ACTIVE | Noted: 2025-08-01

## 2025-08-01 PROBLEM — F51.5 DREAM ANXIETY DISORDER: Status: ACTIVE | Noted: 2025-08-01

## 2025-08-04 ENCOUNTER — VBI (OUTPATIENT)
Dept: ADMINISTRATIVE | Facility: OTHER | Age: 25
End: 2025-08-04

## 2025-08-08 ENCOUNTER — OFFICE VISIT (OUTPATIENT)
Age: 25
End: 2025-08-08
Payer: COMMERCIAL

## 2025-08-08 PROBLEM — R14.2 BELCHING SYMPTOM: Status: RESOLVED | Noted: 2024-07-08 | Resolved: 2025-08-08

## 2025-08-08 PROBLEM — F41.1 GAD (GENERALIZED ANXIETY DISORDER): Status: ACTIVE | Noted: 2025-08-08

## 2025-08-08 PROBLEM — L65.9 HAIR LOSS: Status: ACTIVE | Noted: 2025-08-08
